# Patient Record
Sex: MALE | Race: BLACK OR AFRICAN AMERICAN | NOT HISPANIC OR LATINO
[De-identification: names, ages, dates, MRNs, and addresses within clinical notes are randomized per-mention and may not be internally consistent; named-entity substitution may affect disease eponyms.]

---

## 2023-12-07 ENCOUNTER — RX ONLY (OUTPATIENT)
Age: 48
Setting detail: RX ONLY
End: 2023-12-07

## 2023-12-19 ENCOUNTER — APPOINTMENT (RX ONLY)
Dept: URBAN - METROPOLITAN AREA CLINIC 150 | Facility: CLINIC | Age: 48
Setting detail: DERMATOLOGY
End: 2023-12-19

## 2023-12-19 DIAGNOSIS — L91.0 HYPERTROPHIC SCAR: ICD-10-CM

## 2023-12-19 PROCEDURE — ? INTRALESIONAL KENALOG

## 2023-12-19 PROCEDURE — ? COUNSELING

## 2023-12-19 PROCEDURE — 11900 INJECT SKIN LESIONS </W 7: CPT

## 2023-12-19 ASSESSMENT — LOCATION SIMPLE DESCRIPTION DERM
LOCATION SIMPLE: CHEST
LOCATION SIMPLE: CHEST

## 2023-12-19 ASSESSMENT — LOCATION ZONE DERM
LOCATION ZONE: TRUNK
LOCATION ZONE: TRUNK

## 2023-12-19 ASSESSMENT — LOCATION DETAILED DESCRIPTION DERM
LOCATION DETAILED: STERNUM
LOCATION DETAILED: STERNUM

## 2023-12-19 NOTE — PROCEDURE: INTRALESIONAL KENALOG
Include Z78.9 (Other Specified Conditions Influencing Health Status) As An Associated Diagnosis?: No
Detail Level: Detailed
Validate Note Data When Using Inventory: Yes
X Size Of Lesion In Cm (Optional): 0
Expiration Date For Kenalog (Optional): Nov 2025
Total Volume (Ccs): 0.1
Concentration Of Kenalog Solution Injected (Mg/Ml): 40.0
Consent: The risks of atrophy were reviewed with the patient.
Treatment Number (Optional): 1
Show Inventory Tab: Hide
Kenalog Preparation: Kenalog
Administered By (Optional): Dr. Owen
Medical Necessity Clause: This procedure was medically necessary because the lesions that were treated were:
Lot # For Kenalog (Optional): [id number]
Ndc# For Kenalog Only: 7794-5302-34
Kenalog Type Of Vial: Multiple Dose

## 2024-03-26 ENCOUNTER — APPOINTMENT (RX ONLY)
Dept: URBAN - METROPOLITAN AREA CLINIC 150 | Facility: CLINIC | Age: 49
Setting detail: DERMATOLOGY
End: 2024-03-26

## 2024-03-26 DIAGNOSIS — L91.0 HYPERTROPHIC SCAR: ICD-10-CM

## 2024-03-26 PROCEDURE — 11900 INJECT SKIN LESIONS </W 7: CPT

## 2024-03-26 PROCEDURE — ? COUNSELING

## 2024-03-26 PROCEDURE — ? INTRALESIONAL KENALOG

## 2024-03-26 ASSESSMENT — LOCATION DETAILED DESCRIPTION DERM: LOCATION DETAILED: STERNUM

## 2024-03-26 ASSESSMENT — LOCATION SIMPLE DESCRIPTION DERM: LOCATION SIMPLE: CHEST

## 2024-03-26 ASSESSMENT — LOCATION ZONE DERM: LOCATION ZONE: TRUNK

## 2024-03-26 NOTE — PROCEDURE: INTRALESIONAL KENALOG
Total Volume (Ccs): .3
Kenalog Type Of Vial: Multiple Dose
Administered By (Optional): Dr. Owen
Validate Note Data When Using Inventory: Yes
How Many Mls Were Removed From The 80 Mg/Ml (5ml) Vial When Preparing The Injectable Solution?: 0
Lot # For Kenalog (Optional): [id number]
Expiration Date For Kenalog (Optional): 11/2025
Include Z78.9 (Other Specified Conditions Influencing Health Status) As An Associated Diagnosis?: No
Show Inventory Tab: Hide
Medical Necessity Clause: This procedure was medically necessary because the lesions that were treated were:
Concentration Of Kenalog Solution Injected (Mg/Ml): 20.0
Consent: The risks of atrophy were reviewed with the patient.
Ndc# For Kenalog Only: 4745-2120-89
Kenalog Preparation: Kenalog
Detail Level: Detailed
Treatment Number (Optional): 2